# Patient Record
(demographics unavailable — no encounter records)

---

## 2021-07-30 NOTE — P.GSHP
History of Present Illness


H&P Date: 21


Chief Complaint: Microcalcifications of concern on recent mammogram left breast





     Jie is a 51-year-old white female who had a bilateral screening mammogram

[place]formed on [id number].  Additional views of the left breast were recommended 

these were done and [id number].  Suspicious group of microcalcifications in the 

central breast was identified and stereotactic core biopsy was recommended.  She

does not feel any lumps masses or nodules of concern in either breast.  She is 

not complaining of any nipple discharge or skin changes.  She has not had any 

recent trauma or infection in the breast.  She has not had any surgery on her 

breast.





Nicotine: 1/2 PPD stopped 3 years ago


Caffiene: 1 cup/day


chocolate: none





 Family history:


Father: Liver cancer








Hormonal history:


Menarche: 10


, breast fed: yes, age at first birth: 19


Menopause: 48


Birth Control pills: None





Surgical history:


7 lithotripsies





Rhinoplasty





Medical history:


Osteoarthritis


fibro myalgia


Diabetes type 2





Social history:


Nicotine: Half a pack per day in the past but stopped 3 years ago


Alcohol: Wine occasionally


Drugs: Negative





- Constitutional


Constitutional: Denies chills, Denies fever





- EENT


Eyes: denies blurred vision, denies pain


Ears: deny: decreased hearing, tinnitus


Ears, nose, mouth and throat: Denies headache, Denies sore throat





- Breasts


Breasts: bilateral: as per HPI





- Respiratory


Respiratory: Denies cough, Denies 7





- Gastrointestinal


Gastrointestinal: Denies abdominal pain, Denies diarrhea, Denies nausea, Denies 

vomiting





- Genitourinary (Female)


Genitourinary: Reports kidney stones





- Menstruation


Menstruation: Reports postmenopausal





- Musculoskeletal


Comment: 





arthritis





- Integumentary


Integumentary: Denies pruritus, Denies rash





- Neurological


Neurological: Denies numbness, Denies weakness





- Psychiatric


Psychiatric: Reports anxiety, Reports depression





- Endocrine


Endocrine: Denies fatigue, Denies weight change





- Hematologic/Lymphatic


Comment: 





none





- Allergic/Immunologic


Allergic/Immunologic: Reports as per HPI





Past Medical History


Past Medical History: Diabetes Mellitus, Fibromyalgia, Hyperlipidemia, 

Hypertension, Osteoarthritis (OA)


History of Any Multi-Drug Resistant Organisms: None Reported


Past Surgical History:  Section


Additional Past Surgical History / Comment(s): Lithotripsy x 7 times


Past Anesthesia/Blood Transfusion Reactions: Previous Problems w/ Anesthesia, 

Motion Sickness


Additional Past Anesthesia/Blood Transfusion Reaction / Comment(s): Woke up 

twice during general anesthesia.  Pt. states gets car sick easily


Past Psychological History: Depression, Panic Disorder


Smoking Status: Former smoker


Past Alcohol Use History: Occasional


Past Drug Use History: None Reported





Medications and Allergies


                                Home Medications











 Medication  Instructions  Recorded  Confirmed  Type


 


Atorvastatin [Lipitor] 20 mg PO DAILY 21 History


 


Cholecalciferol [Vitamin D3 (10 10 mcg PO DAILY 21 History





Mcg = 400 Iu)]    


 


DULoxetine HCL [Cymbalta] 90 mg PO DAILY 21 History


 


Ibuprofen [Motrin] 800 mg PO Q8H PRN 21 History


 


Potassium Chloride ER [K-Dur 20] 90 mg PO DAILY 21 History


 


amLODIPine [Norvasc] 10 mg PO DAILY 21 History


 


lisinopriL 20 mg PO DAILY 21 History


 


metFORMIN  mg PO BID 21 History








                                    Allergies











Allergy/AdvReac Type Severity Reaction Status Date / Time


 


No Known Allergies Allergy   Verified 21 11:10














Surgical - Exam





BMI 47.8





- General


no distress





- Eyes


normal ocular movement





- ENT


normal pinna





- Neck


no masses, trachea midline





- Respiratory


normal respiratory effort, clear to auscultation





- Cardiovascular


Rhythm: regular


Heart Sounds: normal: S1, S2





- Abdomen


Abdomen: soft, non tender, no guarding, no rigid, no rebound





- Integumentary





normal turgor





- Neurologic


no disoriented, no combative





- Musculoskeletal


normal gait, normal posture





- Psychiatric


oriented to time, oriented to person, oriented to place, speech is normal, 

memory intact





Breast exam:


BRA: 50D


inspection: Bilateral grade 3 ptosis


Palpation:


Right breast: Multiple positional exam fibrocystic changes no dominant masses or

 nodules of concern


Right axilla: No adenopathy of concern


left breast: Multiple positional exam fibrocystic changes, no dominant masses or

 nodules of concern


Left axilla: No adenopathy of concern 





Results





Results reviewed with Dr. Sánchez from radiology





Assessment and Plan


Assessment: 





Impression:


1.  Microcalcifications of concern left breast


2.  Hypertension


3.  High cholesterol


4.  Prior history of kidney stones


5.  Anxiety/depression





Plan:


1.  Stereotactic core biopsy left breast





Risks and benefits of procedure discussed with the patient.  These include but 

are not limited to bleeding, infection, reaction to the anesthetic.  

Alternatives such as watchful waiting (biopsy are discussed but not recommended.





Patient understands the above and wishes to proceed.  The procedure scheduled 

for the near future.